# Patient Record
Sex: MALE | Race: ASIAN | NOT HISPANIC OR LATINO | Employment: STUDENT | URBAN - METROPOLITAN AREA
[De-identification: names, ages, dates, MRNs, and addresses within clinical notes are randomized per-mention and may not be internally consistent; named-entity substitution may affect disease eponyms.]

---
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
## 2021-12-06 ENCOUNTER — CLINICAL SUPPORT (OUTPATIENT)
Dept: URGENT CARE | Facility: CLINIC | Age: 21
End: 2021-12-06
Payer: COMMERCIAL

## 2021-12-06 DIAGNOSIS — Z11.9 SCREENING EXAMINATION FOR UNSPECIFIED INFECTIOUS DISEASE: Primary | ICD-10-CM

## 2021-12-06 LAB
CTP QC/QA: YES
SARS-COV-2 RDRP RESP QL NAA+PROBE: NEGATIVE

## 2021-12-06 PROCEDURE — 99211 OFF/OP EST MAY X REQ PHY/QHP: CPT | Mod: S$GLB,,, | Performed by: NURSE PRACTITIONER

## 2021-12-06 PROCEDURE — U0002: ICD-10-PCS | Mod: QW,S$GLB,, | Performed by: NURSE PRACTITIONER

## 2021-12-06 PROCEDURE — 99211 PR OFFICE/OUTPT VISIT, EST, LEVL I: ICD-10-PCS | Mod: S$GLB,,, | Performed by: NURSE PRACTITIONER

## 2021-12-06 PROCEDURE — U0002 COVID-19 LAB TEST NON-CDC: HCPCS | Mod: QW,S$GLB,, | Performed by: NURSE PRACTITIONER

## 2021-12-11 ENCOUNTER — CLINICAL SUPPORT (OUTPATIENT)
Dept: URGENT CARE | Facility: CLINIC | Age: 21
End: 2021-12-11
Payer: COMMERCIAL

## 2021-12-11 DIAGNOSIS — Z11.9 SCREENING EXAMINATION FOR UNSPECIFIED INFECTIOUS DISEASE: Primary | ICD-10-CM

## 2021-12-11 LAB
CTP QC/QA: YES
SARS-COV-2 RDRP RESP QL NAA+PROBE: NEGATIVE

## 2021-12-11 PROCEDURE — U0002: ICD-10-PCS | Mod: QW,S$GLB,, | Performed by: NURSE PRACTITIONER

## 2021-12-11 PROCEDURE — U0002 COVID-19 LAB TEST NON-CDC: HCPCS | Mod: QW,S$GLB,, | Performed by: NURSE PRACTITIONER

## 2021-12-11 PROCEDURE — 99211 PR OFFICE/OUTPT VISIT, EST, LEVL I: ICD-10-PCS | Mod: S$GLB,,, | Performed by: NURSE PRACTITIONER

## 2021-12-11 PROCEDURE — 99211 OFF/OP EST MAY X REQ PHY/QHP: CPT | Mod: S$GLB,,, | Performed by: NURSE PRACTITIONER

## 2023-02-07 ENCOUNTER — OFFICE VISIT (OUTPATIENT)
Dept: URGENT CARE | Facility: CLINIC | Age: 23
End: 2023-02-07
Payer: COMMERCIAL

## 2023-02-07 VITALS
DIASTOLIC BLOOD PRESSURE: 74 MMHG | WEIGHT: 139 LBS | TEMPERATURE: 99 F | SYSTOLIC BLOOD PRESSURE: 132 MMHG | HEART RATE: 87 BPM | OXYGEN SATURATION: 98 % | RESPIRATION RATE: 20 BRPM | HEIGHT: 69 IN | BODY MASS INDEX: 20.59 KG/M2

## 2023-02-07 DIAGNOSIS — R21 RASH: Primary | ICD-10-CM

## 2023-02-07 PROCEDURE — 1159F PR MEDICATION LIST DOCUMENTED IN MEDICAL RECORD: ICD-10-PCS | Mod: CPTII,S$GLB,, | Performed by: NURSE PRACTITIONER

## 2023-02-07 PROCEDURE — 1160F RVW MEDS BY RX/DR IN RCRD: CPT | Mod: CPTII,S$GLB,, | Performed by: NURSE PRACTITIONER

## 2023-02-07 PROCEDURE — 3008F BODY MASS INDEX DOCD: CPT | Mod: CPTII,S$GLB,, | Performed by: NURSE PRACTITIONER

## 2023-02-07 PROCEDURE — 3078F PR MOST RECENT DIASTOLIC BLOOD PRESSURE < 80 MM HG: ICD-10-PCS | Mod: CPTII,S$GLB,, | Performed by: NURSE PRACTITIONER

## 2023-02-07 PROCEDURE — 99213 PR OFFICE/OUTPT VISIT, EST, LEVL III, 20-29 MIN: ICD-10-PCS | Mod: S$GLB,,, | Performed by: NURSE PRACTITIONER

## 2023-02-07 PROCEDURE — 3075F PR MOST RECENT SYSTOLIC BLOOD PRESS GE 130-139MM HG: ICD-10-PCS | Mod: CPTII,S$GLB,, | Performed by: NURSE PRACTITIONER

## 2023-02-07 PROCEDURE — 99213 OFFICE O/P EST LOW 20 MIN: CPT | Mod: S$GLB,,, | Performed by: NURSE PRACTITIONER

## 2023-02-07 PROCEDURE — 87593 ORTHOPOXVIRUS AMP PRB EACH: CPT | Performed by: NURSE PRACTITIONER

## 2023-02-07 PROCEDURE — 3075F SYST BP GE 130 - 139MM HG: CPT | Mod: CPTII,S$GLB,, | Performed by: NURSE PRACTITIONER

## 2023-02-07 PROCEDURE — 1160F PR REVIEW ALL MEDS BY PRESCRIBER/CLIN PHARMACIST DOCUMENTED: ICD-10-PCS | Mod: CPTII,S$GLB,, | Performed by: NURSE PRACTITIONER

## 2023-02-07 PROCEDURE — 1159F MED LIST DOCD IN RCRD: CPT | Mod: CPTII,S$GLB,, | Performed by: NURSE PRACTITIONER

## 2023-02-07 PROCEDURE — 3008F PR BODY MASS INDEX (BMI) DOCUMENTED: ICD-10-PCS | Mod: CPTII,S$GLB,, | Performed by: NURSE PRACTITIONER

## 2023-02-07 PROCEDURE — 3078F DIAST BP <80 MM HG: CPT | Mod: CPTII,S$GLB,, | Performed by: NURSE PRACTITIONER

## 2023-02-07 RX ORDER — METHYLPREDNISOLONE 4 MG/1
TABLET ORAL
Qty: 21 EACH | Refills: 0 | Status: SHIPPED | OUTPATIENT
Start: 2023-02-07

## 2023-02-07 RX ORDER — MUPIROCIN 20 MG/G
OINTMENT TOPICAL 3 TIMES DAILY
Qty: 22 G | Refills: 0 | Status: SHIPPED | OUTPATIENT
Start: 2023-02-07 | End: 2023-02-14

## 2023-02-07 NOTE — LETTER
February 7, 2023      Urgent [facility] - [address] [address]  Phone: [phone]  Fax: [phone]       Patient: Tahir Davenport   YOB: 2000  Date of Visit: 02/07/2023    To Whom It May Concern:    Tonya Davenport  was at [facility] on 02/07/2023. The patient may return to work/school on 2/9/2023 with no restrictions. If you have any questions or concerns, or if I can be of further assistance, please do not hesitate to contact me.    Sincerely,        Johnathan Conner NP

## 2023-02-07 NOTE — PROGRESS NOTES
"Subjective:       Patient ID: Tahir Davenport is a 22 y.o. male.    Vitals:  height is 5' 9" (1.753 m) and weight is 63 kg (139 lb). His temperature is 98.6 °F (37 °C). His blood pressure is 132/74 and his pulse is 87. His respiration is 20 and oxygen saturation is 98%.     Chief Complaint: Rash    23yo male pt reports new onset of rash with large reddened bumps to face, arms, and BL hands x2-3 days.  Reports that some of the bumps cleared on their own after taking Benadryl last night, has not attempted any other medications or treatments.  Denies current fever/chills, denies n/v/d, denies wheezing, SOB, facial or throat swelling, or difficulty swallowing.  Reports that 2-3 prior to symptoms, that he had a severe headache, chills, scratchy throat, and runny nose that have self-resolved, reports multiple negative COVID at-home tests.    Denies exposure to any new cosmetics/lotions, soaps, chemicals, animals, or insects.  Denies known exposures or similar symptoms in anyone he knows.  Denies new sexual contact or new partners.  Denies itching or pain.    Rash  This is a new problem. The current episode started in the past 7 days. The problem is unchanged. The rash is diffuse. The rash is characterized by dryness and redness. He was exposed to nothing. Pertinent negatives include no congestion, cough, diarrhea, fever, shortness of breath, sore throat or vomiting. Treatments tried: benadryl. The treatment provided no relief.     Constitution: Negative for chills and fever.   HENT:  Negative for congestion, postnasal drip, sore throat and trouble swallowing.    Respiratory:  Negative for chest tightness, cough, shortness of breath, stridor and wheezing.    Gastrointestinal:  Negative for nausea, vomiting and diarrhea.   Skin:  Positive for rash and erythema.     Objective:      Physical Exam   Constitutional: He is oriented to person, place, and time. He appears well-developed.   HENT:   Head: Normocephalic and " atraumatic. Head is without abrasion, without contusion and without laceration.   Ears:   Right Ear: External ear normal.   Left Ear: External ear normal.   Nose: Nose normal.   Mouth/Throat: Oropharynx is clear and moist and mucous membranes are normal.   Eyes: Conjunctivae, EOM and lids are normal. Pupils are equal, round, and reactive to light.   Neck: Trachea normal and phonation normal. Neck supple.   Cardiovascular: Normal rate, regular rhythm and normal heart sounds.   Pulmonary/Chest: Effort normal and breath sounds normal. No stridor. No respiratory distress.   Musculoskeletal: Normal range of motion.         General: Normal range of motion.   Neurological: He is alert and oriented to person, place, and time.   Skin: Skin is warm, dry, intact, rash and papular. Capillary refill takes less than 2 seconds. erythema No abrasion, No burn, No bruising and No ecchymosis        Psychiatric: His speech is normal and behavior is normal. Judgment and thought content normal.   Nursing note and vitals reviewed.                            Assessment:       1. Rash          Plan:       Provided education on prescribed medications, recommended continuing Benadryl at night and OTC antihistamine during day, cleansing with antibacterial soap and applying antibiotic ointment twice daily to prevent infection of excoriated areas.  Will call with lab results.  Provided education on return/ER precautions, provided education on recommended isolation precautions with pending Monkeypox labs.  Will send Dermatology referral if Monkeypox results negative and symptoms have not improved with treatment.  Pt verbalized understanding and agreed to plan.      Rash  -     Monkeypox (Orthopoxvirus), PCR  -     methylPREDNISolone (MEDROL DOSEPACK) 4 mg tablet; use as directed  Dispense: 21 each; Refill: 0  -     mupirocin (BACTROBAN) 2 % ointment; Apply topically 3 (three) times daily. for 7 days  Dispense: 22 g; Refill: 0      Patient  Instructions   If your condition worsens or fails to improve, we recommend that you receive another evaluation at the ER immediately, contact your PCP to discuss your concerns, or return here.  You must understand that you've received an urgent care treatment only, and that you may be released before all your medical problems are known or treated.  You, the patient, will arrange for follow-up care as instructed.     Avoid picking or manipulating the wound.  Clean the wound twice a day and put antibiotic ointment on it.  You should seek ER treatment if develop fever, increasing pain, swelling, red streaking, or other signs of increasing infection.     If you are female and on birth control pills, use additional methods to prevent pregnancy while on antibiotics and for one cycle after.  If you were given narcotics, do not drive or operate heavy equipment or machinery while taking these medications.     Tylenol or Ibuprofen can also be used as directed for pain unless you have an allergy to them or medical condition (such as stomach ulcers, kidney or liver disease, or use blood thinners, etc.) for which you should not be taking these type of medications.

## 2023-02-10 LAB — NONVAR ORTHPX DNA SPEC QL NAA+PROBE: NORMAL

## 2023-02-13 ENCOUNTER — TELEPHONE (OUTPATIENT)
Dept: URGENT CARE | Facility: CLINIC | Age: 23
End: 2023-02-13

## 2023-02-13 NOTE — TELEPHONE ENCOUNTER
"I called number listed mobile".  His mom answered, and apparently this is her number.  She was unable to provide his cell number.  She states she will have him call us  "

## 2023-02-14 ENCOUNTER — TELEPHONE (OUTPATIENT)
Dept: URGENT CARE | Facility: CLINIC | Age: 23
End: 2023-02-14

## 2023-02-22 ENCOUNTER — TELEPHONE (OUTPATIENT)
Dept: URGENT CARE | Facility: CLINIC | Age: 23
End: 2023-02-22

## 2023-02-22 NOTE — TELEPHONE ENCOUNTER
"I spoke with patient and reported monkey pox result, "undetected".  I made sure we had his correct phone number as well.  He reports rash is now resolved.  "

## 2024-04-08 ENCOUNTER — OFFICE VISIT (OUTPATIENT)
Dept: URGENT CARE | Facility: CLINIC | Age: 24
End: 2024-04-08
Payer: COMMERCIAL

## 2024-04-08 VITALS
BODY MASS INDEX: 29.2 KG/M2 | SYSTOLIC BLOOD PRESSURE: 132 MMHG | HEIGHT: 70 IN | DIASTOLIC BLOOD PRESSURE: 81 MMHG | TEMPERATURE: 98 F | WEIGHT: 204 LBS | OXYGEN SATURATION: 98 % | RESPIRATION RATE: 19 BRPM | HEART RATE: 79 BPM

## 2024-04-08 DIAGNOSIS — R10.9 STOMACH PAIN: Primary | ICD-10-CM

## 2024-04-08 PROCEDURE — 99212 OFFICE O/P EST SF 10 MIN: CPT | Mod: S$GLB,,,

## 2024-04-08 NOTE — LETTER
April 8, 2024      [facility] [place]  [address] [facility] [facility] [address]  Phone: [phone]  Fax: [phone]       Patient: Tahir Davenport   YOB: 2000  Date of Visit: 04/08/2024    To Whom It May Concern:    Tonya Davenport  was at [facility] on 04/08/2024. The patient may return to work on 4/10/24 with no restrictions. If you have any questions or concerns, or if I can be of further assistance, please do not hesitate to contact me.    Sincerely,          Shama Garcias NP

## 2024-04-08 NOTE — PATIENT INSTRUCTIONS
Drink lots of clear liquids and stay hydrated.    Lot of high-fiber foods such as fruit and vegetables.  Avoid spicy or greasy foods for the next 24 hours.     If you experience any sharp abdominal pain, increased vomiting, dehydration, or fever please return to urgent care of the emergency department.

## 2024-04-08 NOTE — PROGRESS NOTES
"Subjective:      Patient ID: Tahir Davenport is a 23 y.o. male.    Vitals:  height is 5' 10" (1.778 m) and weight is 92.5 kg (204 lb). His oral temperature is 98.1 °F (36.7 °C). His blood pressure is 132/81 and his pulse is 79. His respiration is 19 and oxygen saturation is 98%.     Chief Complaint: Abdominal Pain    Patient present with abdominal aching pain that began this morning and has now resolved.  Patient states that pain resolved after bowel movement this morning. Patients denies any nausea, vomiting, or fever.  Normal appetite and urine output.  Patient requesting note for work.     Abdominal Pain  This is a new problem. The current episode started yesterday. The pain is located in the generalized abdominal region. The pain is at a severity of 2/10. The pain is mild. The quality of the pain is aching. The abdominal pain does not radiate. Pertinent negatives include no constipation, diarrhea, fever, headaches, hematochezia, nausea or vomiting. The treatment provided no relief.       Constitution: Negative for fever and generalized weakness.   HENT:  Negative for ear pain, sinus pain and sore throat.    Neck: Negative for neck pain.   Cardiovascular:  Negative for chest pain.   Respiratory:  Negative for cough and shortness of breath.    Gastrointestinal:  Positive for abdominal pain. Negative for nausea, vomiting, constipation, diarrhea, bright red blood in stool and rectal bleeding.   Neurological:  Negative for headaches.      Objective:     Physical Exam   Constitutional: He is oriented to person, place, and time. He appears well-developed.   HENT:   Head: Normocephalic and atraumatic.   Ears:   Right Ear: External ear normal.   Left Ear: External ear normal.   Nose: Nose normal.   Mouth/Throat: Mucous membranes are normal.   Eyes: Conjunctivae and lids are normal.   Neck: Trachea normal. Neck supple.   Cardiovascular: Normal rate, regular rhythm and normal heart sounds.   Pulmonary/Chest: Effort " normal and breath sounds normal. No respiratory distress.   Abdominal: Normal appearance and bowel sounds are normal. He exhibits no distension and no mass. Soft. flat abdomen There is no abdominal tenderness. There is no rebound, no guarding, no left CVA tenderness and no right CVA tenderness.   Musculoskeletal: Normal range of motion.         General: Normal range of motion.   Neurological: He is alert and oriented to person, place, and time. He has normal strength.   Skin: Skin is warm, dry, intact, not diaphoretic and not pale.   Psychiatric: His speech is normal and behavior is normal. Judgment and thought content normal.   Nursing note and vitals reviewed.      Assessment:     1. Stomach pain        Plan:   Patient is very well-appearing, alert, no acute distress, VSS, afebrile without recent antipyretic, and appears well-hydrated.  Physical exam as described above.  No history or exam finding concerning for appendicitis, pancreatitis, cholecystitis, diverticulitis, bacterial gastroenteritis, renal calculi, testicular torsion, or acute abdomen.  Abdomen soft and non-tender, no guarding or rebound tenderness, no CVA tenderness. Tolerating PO.  No concern for dehydration or hypoglycemia. Suspect mild constipation as cause of symptoms. Thoroughly reviewed strict RTED precautions and supportive care; patient verbalizes understanding and agrees to follow-up with PCP in 1-2 days as needed.     Stomach pain      Patient Instructions   Drink lots of clear liquids and stay hydrated.    Lot of high-fiber foods such as fruit and vegetables.  Avoid spicy or greasy foods for the next 24 hours.     If you experience any sharp abdominal pain, increased vomiting, dehydration, or fever please return to urgent care of the emergency department.